# Patient Record
Sex: FEMALE | Race: WHITE | HISPANIC OR LATINO
[De-identification: names, ages, dates, MRNs, and addresses within clinical notes are randomized per-mention and may not be internally consistent; named-entity substitution may affect disease eponyms.]

---

## 2020-08-18 ENCOUNTER — APPOINTMENT (OUTPATIENT)
Dept: MRI IMAGING | Facility: CLINIC | Age: 45
End: 2020-08-18
Payer: COMMERCIAL

## 2020-08-18 PROCEDURE — 73221 MRI JOINT UPR EXTREM W/O DYE: CPT | Mod: RT

## 2020-09-01 ENCOUNTER — APPOINTMENT (OUTPATIENT)
Dept: RADIOLOGY | Facility: CLINIC | Age: 45
End: 2020-09-01
Payer: COMMERCIAL

## 2020-09-01 PROCEDURE — 77080 DXA BONE DENSITY AXIAL: CPT

## 2020-09-08 PROBLEM — Z00.00 ENCOUNTER FOR PREVENTIVE HEALTH EXAMINATION: Status: ACTIVE | Noted: 2020-09-08

## 2020-09-10 ENCOUNTER — APPOINTMENT (OUTPATIENT)
Dept: ORTHOPEDIC SURGERY | Facility: CLINIC | Age: 45
End: 2020-09-10

## 2020-10-12 ENCOUNTER — APPOINTMENT (OUTPATIENT)
Dept: ORTHOPEDIC SURGERY | Facility: CLINIC | Age: 45
End: 2020-10-12
Payer: COMMERCIAL

## 2020-10-12 VITALS
HEART RATE: 73 BPM | HEIGHT: 61 IN | DIASTOLIC BLOOD PRESSURE: 70 MMHG | BODY MASS INDEX: 20.77 KG/M2 | WEIGHT: 110 LBS | TEMPERATURE: 97.3 F | SYSTOLIC BLOOD PRESSURE: 111 MMHG

## 2020-10-12 DIAGNOSIS — M67.432 GANGLION, LEFT WRIST: ICD-10-CM

## 2020-10-12 DIAGNOSIS — Z78.9 OTHER SPECIFIED HEALTH STATUS: ICD-10-CM

## 2020-10-12 PROCEDURE — 99203 OFFICE O/P NEW LOW 30 MIN: CPT

## 2020-10-12 NOTE — HISTORY OF PRESENT ILLNESS
[Right] : right hand dominant [FreeTextEntry1] : She comes in today for an evaluation of a left wrist "lump" since 2 months ago. She denies any trauma or pain. \par \par She was referred by Dr. Churchill.

## 2020-10-12 NOTE — PHYSICAL EXAM
[de-identified] : - Constitutional: This is a female in no obvious distress.  \par - Psych: Patient is alert and oriented to person, place and time.  Patient has a normal mood and affect.\par - Cardiovascular: Normal pulses throughout the upper extremities.  No significant varicosities are noted in the upper extremities. \par - Neuro: Strength and sensation are intact throughout the upper extremities.  Patient has normal coordination.\par - Respiratory:  Patient exhibits no evidence of shortness of breath or difficulty breathing.\par - Skin: No rashes, lesions, or other abnormalities are noted in the upper extremities.\par ---\par \par Examination of her left wrist demonstrates a small volar ganglion cyst.  It is nontender.  It is relatively firm.  She has full motion.  It is nonpulsatile.  She is neurovascularly intact distally.\par

## 2020-10-12 NOTE — DISCUSSION/SUMMARY
"Chief Complaint   Patient presents with     Study Results     CPAP Follow Up       Initial /77  Pulse 60  Ht 1.753 m (5' 9\")  Wt 93.4 kg (206 lb)  SpO2 97%  BMI 30.42 kg/m2 Estimated body mass index is 30.42 kg/(m^2) as calculated from the following:    Height as of this encounter: 1.753 m (5' 9\").    Weight as of this encounter: 93.4 kg (206 lb).  Medication Reconciliation: complete    " [FreeTextEntry1] : She has findings consistent with an asymptomatic left wrist volar ganglion cyst.\par \par I had a discussion regarding today's visit, the diagnosis, and treatment recommendations / options. At this time, I recommended either observation or a cyst aspiration. As it is not bothering her, she agreed to proceed with observation.  I reassured her that this is not a cause for concern.  She will follow-up if her cyst increases in size or she develops pain. \par \par The patient has agreed to this plan of management and has expressed full understanding.  All questions were fully answered to the patient's satisfaction.\par \par Over 50% of the time spent with the patient was on counseling the patient on the above diagnosis, treatment plan and prognosis.\par \par

## 2020-10-12 NOTE — ADDENDUM
[FreeTextEntry1] : I, Enedina Rueda, acted solely as a scribe for Dr. Bhatia on this date 10/12/2020.\par \par

## 2020-10-12 NOTE — CONSULT LETTER
[Dear  ___] : Dear  [unfilled], [FreeTextEntry1] : Massimo Bhatia M.D.\par Surgery of the Hand & Upper Extremity\par Orthopaedic Surgery\par Chief, Hand Service, [facility]\par Director, Hand Service, [facility]\par  of Orthopedic Surgery, [facility] [employer] [facility] [place] \par [facility]\par \par Email: [email or web address] Office Phone: [phone]\par \par

## 2020-10-19 ENCOUNTER — APPOINTMENT (OUTPATIENT)
Dept: MRI IMAGING | Facility: CLINIC | Age: 45
End: 2020-10-19
Payer: COMMERCIAL

## 2020-10-19 PROCEDURE — 99072 ADDL SUPL MATRL&STAF TM PHE: CPT

## 2020-10-19 PROCEDURE — 72141 MRI NECK SPINE W/O DYE: CPT

## 2020-11-04 ENCOUNTER — APPOINTMENT (OUTPATIENT)
Dept: MAMMOGRAPHY | Facility: CLINIC | Age: 45
End: 2020-11-04
Payer: COMMERCIAL

## 2020-11-04 PROCEDURE — 77063 BREAST TOMOSYNTHESIS BI: CPT

## 2020-11-04 PROCEDURE — 99072 ADDL SUPL MATRL&STAF TM PHE: CPT

## 2020-11-04 PROCEDURE — 77067 SCR MAMMO BI INCL CAD: CPT

## 2020-11-24 ENCOUNTER — APPOINTMENT (OUTPATIENT)
Dept: ULTRASOUND IMAGING | Facility: CLINIC | Age: 45
End: 2020-11-24

## 2020-12-08 ENCOUNTER — APPOINTMENT (OUTPATIENT)
Dept: ULTRASOUND IMAGING | Facility: CLINIC | Age: 45
End: 2020-12-08
Payer: COMMERCIAL

## 2020-12-08 ENCOUNTER — RESULT REVIEW (OUTPATIENT)
Age: 45
End: 2020-12-08

## 2020-12-08 PROCEDURE — 76830 TRANSVAGINAL US NON-OB: CPT

## 2021-01-25 ENCOUNTER — APPOINTMENT (OUTPATIENT)
Dept: BREAST CENTER | Facility: CLINIC | Age: 46
End: 2021-01-25
Payer: COMMERCIAL

## 2021-01-25 VITALS
BODY MASS INDEX: 20.77 KG/M2 | WEIGHT: 110 LBS | HEART RATE: 73 BPM | SYSTOLIC BLOOD PRESSURE: 134 MMHG | HEIGHT: 61 IN | DIASTOLIC BLOOD PRESSURE: 86 MMHG | TEMPERATURE: 98.1 F

## 2021-01-25 DIAGNOSIS — R92.2 INCONCLUSIVE MAMMOGRAM: ICD-10-CM

## 2021-01-25 PROCEDURE — 99203 OFFICE O/P NEW LOW 30 MIN: CPT

## 2021-01-25 PROCEDURE — 99072 ADDL SUPL MATRL&STAF TM PHE: CPT

## 2021-01-25 RX ORDER — MAGNESIUM 100 MG
100 TABLET ORAL
Refills: 0 | Status: ACTIVE | COMMUNITY

## 2021-01-25 RX ORDER — CHOLECALCIFEROL (VITAMIN D3) 50 MCG
50 MCG CAPSULE ORAL
Refills: 0 | Status: ACTIVE | COMMUNITY

## 2021-01-25 RX ORDER — MULTIVIT-MIN/FOLIC/VIT K/LYCOP 400-300MCG
1000 TABLET ORAL
Refills: 0 | Status: ACTIVE | COMMUNITY

## 2021-01-25 NOTE — CONSULT LETTER
[Dear  ___] : Dear  [unfilled], [Consult Letter:] : I had the pleasure of evaluating your patient, [unfilled]. [Please see my note below.] : Please see my note below. [Consult Closing:] : Thank you very much for allowing me to participate in the care of this patient.  If you have any questions, please do not hesitate to contact me. [Sincerely,] : Sincerely, [FreeTextEntry3] : Alise Mayberry MD

## 2021-01-25 NOTE — DATA REVIEWED
[FreeTextEntry1] : 11/4/20 Heriberto Davis, TC is 24.4%, extremely dense breasts, right 12:00 tissue marker from prior biopsy. BR2

## 2021-01-25 NOTE — ASSESSMENT
[FreeTextEntry1] : 44 y/o female here for consultation, per Dr. Caty Peoples for high risk, TC was 24.4% on mmg report.  Pt is taking birth control does not know the name.\par Pt states in Nov her left breast was enlarged but now resolved. \par Pt denies any breast lesions, discharge or masses.\par Hx of benign bx 2017, does not know exact path, but no atypia.\par FHx negative for any cancers. \par 11/4/20 Heriberto Davis, TC is 24.4%, extremely dense breasts, right 12:00 tissue marker from prior biopsy. BR2\par CBE: FCC, dense breast, RIO.\par \par Reviewed studies with pt, extremely dense breasts and limited CBE so would rec screening u/s. Pt may have insurance issues to approve it. \par Also recalculated the TC score with ERIN and lifetime risk is 12.5% not 24.4%.\par

## 2021-01-25 NOTE — HISTORY OF PRESENT ILLNESS
[FreeTextEntry1] : 46 y/o female here for consultation, per Dr. Caty Peoples for high risk, TC was 24.4% on mmg report.  Pt is taking birth control does not know the name.\par Pt states in Nov her left breast was enlarged but now resolved. \par Pt denies any breast lesions, discharge or masses.\par Hx of benign bx 2017, does not know exact path, but no atypia.\par FHx negative for any cancers. \par 11/4/20 Heriberto Davis, TC is 24.4%, extremely dense breasts, right 12:00 tissue marker from prior biopsy. BR2

## 2021-01-25 NOTE — PHYSICAL EXAM
[Bra Size: ___] : Bra Size: [unfilled] [Normocephalic] : normocephalic [Atraumatic] : atraumatic [Supple] : supple [No Supraclavicular Adenopathy] : no supraclavicular adenopathy [No Thyromegaly] : no thyromegaly [Examined in the supine and seated position] : examined in the supine and seated position [Asymmetrical] : asymmetrical [No dominant masses] : no dominant masses in right breast  [No dominant masses] : no dominant masses left breast [No Nipple Retraction] : no left nipple retraction [No Nipple Discharge] : no left nipple discharge [No Axillary Lymphadenopathy] : no left axillary lymphadenopathy [No Edema] : no edema [No Swelling] : no swelling [Full ROM] : full range of motion [No Rashes] : no rashes [No Ulceration] : no ulceration [de-identified] : left slightly larger overall than right.\par

## 2021-02-12 ENCOUNTER — APPOINTMENT (OUTPATIENT)
Dept: OBGYN | Facility: CLINIC | Age: 46
End: 2021-02-12
Payer: COMMERCIAL

## 2021-02-12 VITALS
BODY MASS INDEX: 21.14 KG/M2 | HEIGHT: 61 IN | WEIGHT: 112 LBS | DIASTOLIC BLOOD PRESSURE: 80 MMHG | SYSTOLIC BLOOD PRESSURE: 122 MMHG

## 2021-02-12 DIAGNOSIS — Z30.09 ENCOUNTER FOR OTHER GENERAL COUNSELING AND ADVICE ON CONTRACEPTION: ICD-10-CM

## 2021-02-12 PROCEDURE — 99213 OFFICE O/P EST LOW 20 MIN: CPT

## 2021-02-12 PROCEDURE — 99072 ADDL SUPL MATRL&STAF TM PHE: CPT

## 2021-02-12 NOTE — HISTORY OF PRESENT ILLNESS
[FreeTextEntry1] : 46yo seen in [facility] here for urgent visit RLQ pain. She had pain 3 times in 2 wks. Made better by Advil. + Diarrhea. No other bowel changes. Pt has sxs of depression (decreased energy, appetite, body aches, insomnia) but denies any Si/Hi. has not seen her PCP. Wants to change her COCs.

## 2021-02-12 NOTE — PHYSICAL EXAM
[Labia Majora] : normal [Labia Minora] : normal [Normal] : normal [Uterine Adnexae] : normal [FreeTextEntry7] : mild RLQ pain. No R/G/R  [FreeTextEntry5] : GC/CT' BD affirm

## 2021-02-12 NOTE — DISCUSSION/SUMMARY
[FreeTextEntry1] : 46yo here for urgent visit with mulitple complaints. Pt has sxs of depression and needs to see her PCP (Dr Keys) ASAP. She denies any Si/Hi today and precautions were reviewed.\par \par RLQ pain: low suspcion of appy as pt has pain for 10 min at a time only 3 times over last 2 wks\par - TVUS to r/o ovarian cyst \par - BD affirm \par - GC/CT \par - She desires to switch COCs as thinks this may be contributing to her mood issues\par \par Alem Davenport MD \par Obstetrician/Gynecologist\par

## 2021-02-16 ENCOUNTER — NON-APPOINTMENT (OUTPATIENT)
Age: 46
End: 2021-02-16

## 2021-02-16 ENCOUNTER — RESULT REVIEW (OUTPATIENT)
Age: 46
End: 2021-02-16

## 2021-02-16 ENCOUNTER — APPOINTMENT (OUTPATIENT)
Dept: ULTRASOUND IMAGING | Facility: CLINIC | Age: 46
End: 2021-02-16
Payer: COMMERCIAL

## 2021-02-16 LAB
C TRACH RRNA SPEC QL NAA+PROBE: NOT DETECTED
CANDIDA VAG CYTO: NOT DETECTED
G VAGINALIS+PREV SP MTYP VAG QL MICRO: DETECTED
N GONORRHOEA RRNA SPEC QL NAA+PROBE: NOT DETECTED
SOURCE AMPLIFICATION: NORMAL
T VAGINALIS VAG QL WET PREP: NOT DETECTED

## 2021-02-16 PROCEDURE — 76641 ULTRASOUND BREAST COMPLETE: CPT | Mod: 50

## 2021-03-09 ENCOUNTER — APPOINTMENT (OUTPATIENT)
Dept: RADIOLOGY | Facility: CLINIC | Age: 46
End: 2021-03-09
Payer: COMMERCIAL

## 2021-03-09 ENCOUNTER — RESULT REVIEW (OUTPATIENT)
Age: 46
End: 2021-03-09

## 2021-03-09 ENCOUNTER — APPOINTMENT (OUTPATIENT)
Dept: ULTRASOUND IMAGING | Facility: CLINIC | Age: 46
End: 2021-03-09
Payer: COMMERCIAL

## 2021-03-09 PROCEDURE — 76830 TRANSVAGINAL US NON-OB: CPT

## 2021-03-09 PROCEDURE — 72100 X-RAY EXAM L-S SPINE 2/3 VWS: CPT

## 2021-03-09 PROCEDURE — 72200 X-RAY EXAM SI JOINTS: CPT

## 2021-04-08 ENCOUNTER — NON-APPOINTMENT (OUTPATIENT)
Age: 46
End: 2021-04-08

## 2021-05-10 ENCOUNTER — APPOINTMENT (OUTPATIENT)
Dept: OBGYN | Facility: CLINIC | Age: 46
End: 2021-05-10
Payer: COMMERCIAL

## 2021-05-10 ENCOUNTER — ASOB RESULT (OUTPATIENT)
Age: 46
End: 2021-05-10

## 2021-05-10 VITALS
WEIGHT: 114 LBS | HEIGHT: 61 IN | SYSTOLIC BLOOD PRESSURE: 120 MMHG | BODY MASS INDEX: 21.52 KG/M2 | DIASTOLIC BLOOD PRESSURE: 68 MMHG

## 2021-05-10 VITALS — TEMPERATURE: 98.1 F

## 2021-05-10 PROCEDURE — 99072 ADDL SUPL MATRL&STAF TM PHE: CPT

## 2021-05-10 PROCEDURE — 76830 TRANSVAGINAL US NON-OB: CPT

## 2021-05-10 PROCEDURE — 76856 US EXAM PELVIC COMPLETE: CPT | Mod: 59

## 2021-05-10 PROCEDURE — 99212 OFFICE O/P EST SF 10 MIN: CPT

## 2021-05-10 RX ORDER — NORETHINDRONE ACETATE AND ETHINYL ESTRADIOL AND FERROUS FUMARATE 1MG-20(21)
1-20 KIT ORAL
Qty: 84 | Refills: 3 | Status: DISCONTINUED | COMMUNITY
Start: 2021-02-12 | End: 2021-05-10

## 2021-05-10 NOTE — HISTORY OF PRESENT ILLNESS
[FreeTextEntry1] : 46yo here for siono follow. Pt has intermittent midline/suprapubic pain lasting 2 min, last episode was 1 month ago. Sono today demonstrates b/l ovarian cysts both about 2 cm each.

## 2021-05-10 NOTE — PHYSICAL EXAM
[Soft] : soft [Non-tender] : non-tender [Non-distended] : non-distended [No Lesions] : no lesions [No Mass] : no mass [FreeTextEntry7] : Unable to reproduce pain on today's exam.

## 2021-05-14 LAB
BACTERIA UR CULT: NORMAL
URINE CYTOLOGY: NORMAL

## 2021-06-17 NOTE — DISCUSSION/SUMMARY
Patient decided on 6/7 not to start his 6th cycle of CYBORD and stopped several other medications as well. Plans to follow up with doctor at [facility] for next steps. Telephone call placed for nutrition follow up. Call went straight to voicemail. Message left with contact information. Patient encouraged to call back with any questions or concerns. [FreeTextEntry1] : 44yo s/p sono today with simple, b/l, ov cyst both <2 min. No other sig findings. Pain intermittent. Strict torsion precautions reviewed. RTO in 6wks for repeat sono. Will give info fr Dr Acosta for coloscopy. Urine for cyto today. \par \par Alem Abraham MD \par Obstetrician/Gynecologist\par

## 2021-06-21 ENCOUNTER — ASOB RESULT (OUTPATIENT)
Age: 46
End: 2021-06-21

## 2021-06-21 ENCOUNTER — APPOINTMENT (OUTPATIENT)
Dept: OBGYN | Facility: CLINIC | Age: 46
End: 2021-06-21
Payer: COMMERCIAL

## 2021-06-21 VITALS
HEIGHT: 61 IN | SYSTOLIC BLOOD PRESSURE: 110 MMHG | BODY MASS INDEX: 20.77 KG/M2 | DIASTOLIC BLOOD PRESSURE: 66 MMHG | WEIGHT: 110 LBS

## 2021-06-21 PROCEDURE — 76830 TRANSVAGINAL US NON-OB: CPT

## 2021-06-21 PROCEDURE — 99072 ADDL SUPL MATRL&STAF TM PHE: CPT

## 2021-06-21 PROCEDURE — 99212 OFFICE O/P EST SF 10 MIN: CPT

## 2021-06-21 PROCEDURE — 76856 US EXAM PELVIC COMPLETE: CPT | Mod: 59

## 2021-06-21 NOTE — DISCUSSION/SUMMARY
[FreeTextEntry1] : 45yo LMP 6/13 here for follow up with b/l ov cyst without any concerning features and improved pain. \par \par - RTO late Sept for repeat sono \par - Torsion and rupture precautions reviewed \par - RTO in Dec for annual \par - All questions solicited and answered \par \par BCM \par - Pt started COCs yesterday \par - VTE, DVT, PE, MI, CVA and elevated BP precautions reviewed. \par - Pt RTO in sept for BP check \par \par Alem Abraham MD \par Obstetrician/Gynecologist\par \par \par \par Alem Abraham MD \par Obstetrician/Gynecologist\par

## 2021-06-21 NOTE — HISTORY OF PRESENT ILLNESS
[FreeTextEntry1] : 46yo 6/13 here for sono follow up.  Today her b/l ovarian cysts remain however her Lov cyst is about the same and her R ov cyst which was 3cm either resolved or shrunken and she now has two R ov cysts now 2cm or less.  No concerning features about either of them.  She denies any more period this month was normal. Of note she started COCs yesterday.

## 2021-07-21 ENCOUNTER — OUTPATIENT (OUTPATIENT)
Dept: OUTPATIENT SERVICES | Facility: HOSPITAL | Age: 46
LOS: 1 days | End: 2021-07-21

## 2021-10-04 ENCOUNTER — APPOINTMENT (OUTPATIENT)
Dept: OBGYN | Facility: CLINIC | Age: 46
End: 2021-10-04
Payer: COMMERCIAL

## 2021-10-04 ENCOUNTER — ASOB RESULT (OUTPATIENT)
Age: 46
End: 2021-10-04

## 2021-10-04 VITALS
WEIGHT: 114 LBS | DIASTOLIC BLOOD PRESSURE: 70 MMHG | HEIGHT: 61 IN | SYSTOLIC BLOOD PRESSURE: 118 MMHG | BODY MASS INDEX: 21.52 KG/M2

## 2021-10-04 PROCEDURE — 76830 TRANSVAGINAL US NON-OB: CPT

## 2021-10-04 PROCEDURE — 99212 OFFICE O/P EST SF 10 MIN: CPT

## 2021-10-04 PROCEDURE — 76856 US EXAM PELVIC COMPLETE: CPT | Mod: 59

## 2021-10-04 PROCEDURE — 99072 ADDL SUPL MATRL&STAF TM PHE: CPT

## 2021-11-18 ENCOUNTER — APPOINTMENT (OUTPATIENT)
Dept: RADIOLOGY | Facility: CLINIC | Age: 46
End: 2021-11-18
Payer: COMMERCIAL

## 2021-11-18 PROCEDURE — 72070 X-RAY EXAM THORAC SPINE 2VWS: CPT

## 2021-11-19 ENCOUNTER — APPOINTMENT (OUTPATIENT)
Dept: NEUROSURGERY | Facility: CLINIC | Age: 46
End: 2021-11-19
Payer: COMMERCIAL

## 2021-11-19 VITALS
TEMPERATURE: 98.5 F | WEIGHT: 110 LBS | DIASTOLIC BLOOD PRESSURE: 80 MMHG | HEIGHT: 61 IN | BODY MASS INDEX: 20.77 KG/M2 | SYSTOLIC BLOOD PRESSURE: 118 MMHG | OXYGEN SATURATION: 99 % | HEART RATE: 74 BPM

## 2021-11-19 PROCEDURE — 99203 OFFICE O/P NEW LOW 30 MIN: CPT

## 2021-11-19 PROCEDURE — 99072 ADDL SUPL MATRL&STAF TM PHE: CPT

## 2021-11-19 RX ORDER — METHYLPREDNISOLONE 4 MG/1
4 TABLET ORAL
Qty: 1 | Refills: 0 | Status: ACTIVE | COMMUNITY
Start: 2021-11-19 | End: 1900-01-01

## 2021-11-21 NOTE — HISTORY OF PRESENT ILLNESS
[> 3 months] : more  than 3 months [FreeTextEntry1] : neck, thoracic and UE pain.  [de-identified] : THONG BANDA is a 46 year old female presents for initial neurosurgical evaluation of cervical radiculopathy and thoracic pain.  No significant medical history.  Patient mentions her symptoms started about 6 months ago.  No trauma or injury.  Patient states she is employed as a .  Patient describes severe neck pain with radiation to R > L UE's.  Patient states the pain radiates to the right arm most prominent at the elbow.  She endorses loss of dexterity.  Left arm is intermittent pain.  No weakness.  Pain intensity 5/10.  At its worse 10/10.  Patient states the pain radiates to thoracic region at times with radiation to the front of body.  UE > neck pain.  Denies any saddle anesthesia, urinary or bowel incontinence.\par Patient is ambulating independently.  No loss of balance.  Patient states the pain in neck, arms and upper back was so severe she presented to [facility]  Xray of thoracic spine was obtained revealing no acute findings.  Patient has been involved in physical therapy for about 7 weeks which provides short lived improvement of pain. \par No UE EMG. She is managing her pain with NSAIDS. \par \par \par \par Denies any saddle anesthesia, urinary or bowel incontinence.\par \par

## 2021-11-21 NOTE — REASON FOR VISIT
[New Patient Visit] : a new patient visit [Referred By: _________] : Patient was referred by ANTONINO [FreeTextEntry1] : right cervical radiculopathy

## 2021-11-21 NOTE — PHYSICAL EXAM
[General Appearance - Alert] : alert [General Appearance - In No Acute Distress] : in no acute distress [Oriented To Time, Place, And Person] : oriented to person, place, and time [Impaired Insight] : insight and judgment were intact [Affect] : the affect was normal [Person] : oriented to person [Place] : oriented to place [Time] : oriented to time [Short Term Intact] : short term memory intact [Remote Intact] : remote memory intact [Fluency] : fluency intact [Comprehension] : comprehension intact [Vocabulary] : adequate range of vocabulary [Cranial Nerves Optic (II)] : visual acuity intact bilaterally,  pupils equal round and reactive to light [Cranial Nerves Oculomotor (III)] : extraocular motion intact [Cranial Nerves Trigeminal (V)] : facial sensation intact symmetrically [Cranial Nerves Facial (VII)] : face symmetrical [Cranial Nerves Glossopharyngeal (IX)] : tongue and palate midline [Cranial Nerves Accessory (XI - Cranial And Spinal)] : head turning and shoulder shrug symmetric [Cranial Nerves Hypoglossal (XII)] : there was no tongue deviation with protrusion [Motor Tone] : muscle tone was normal in all four extremities [Motor Strength] : muscle strength was normal in all four extremities [No Muscle Atrophy] : normal bulk in all four extremities [4] : C5 Biceps 4/5 [5] : S1 toe walking 5/5 [Sensation Tactile Decrease] : light touch was intact [Sensation Pain / Temperature Decrease] : pain and temperature was intact [Abnormal Walk] : normal gait [Balance] : balance was intact [2+] : Patella left 2+ [No Visual Abnormalities] : no visible abnormailities [Normal] : normal [Able to toe walk] : the patient was able to toe walk [Able to heel walk] : the patient was able to heel walk [Over the Past 2 Weeks, Have You Felt Down, Depressed, or Hopeless?] : 1.) Over the past 2 weeks, have you felt down, depressed, or hopeless? No [Over the Past 2 Weeks, Have You Felt Little Interest or Pleasure Doing Things?] : 2.) Over the past 2 weeks, have you felt little interest or pleasure doing things? No [Span Intact] : the attention span was decreased [Concentration Intact] : a decrease in concentrating ability was observed [Current Events] : inadequate knowledge of current events [Past History] : inadequate knowledge of personal past history [Past-pointing] : there was no past-pointing [Tremor] : no tremor present

## 2021-11-21 NOTE — ASSESSMENT
[FreeTextEntry1] : Ms. Flores presents with above history and imaging.  Patient has 4/5 RUE strength which is concerning.  I would like to obtain an MRI cervical and thoracic to further assess her symptoms.\par Continue physical therapy in the interim.\par Medrol dose pack as directed.\par Follow up after imaging.\par Patient has been given an opportunity to ask and have their questions answered to their satisfaction.\par Patient knows to call the office if there are any new or worsening symptoms.\par

## 2021-11-21 NOTE — REVIEW OF SYSTEMS
[As Noted in HPI] : as noted in HPI [Arm Weakness] : arm weakness [Numbness] : numbness [Tingling] : tingling [Negative] : Heme/Lymph [Difficulty Walking] : no difficulty walking [Incontinence] : no incontinence [FreeTextEntry9] : neck and thoracic region pain

## 2021-12-17 ENCOUNTER — ASOB RESULT (OUTPATIENT)
Age: 46
End: 2021-12-17

## 2021-12-17 ENCOUNTER — APPOINTMENT (OUTPATIENT)
Dept: OBGYN | Facility: CLINIC | Age: 46
End: 2021-12-17
Payer: COMMERCIAL

## 2021-12-17 PROCEDURE — 76830 TRANSVAGINAL US NON-OB: CPT

## 2021-12-17 PROCEDURE — 99072 ADDL SUPL MATRL&STAF TM PHE: CPT

## 2021-12-17 PROCEDURE — 76856 US EXAM PELVIC COMPLETE: CPT | Mod: 59

## 2022-01-04 NOTE — HISTORY OF PRESENT ILLNESS
[FreeTextEntry1] : 47yo LMP 9/6 here for follow up of TVUS and results review. She had a sono today which demonstrates b/l ovarian cysts. She has two R ov cyst. One is echogenic and remains <1cm and the other is simple and 2cm.  Her  L ov cyst is follicular in appearance and about 2cm as well.  She continues on COCs and is doing well.

## 2022-01-04 NOTE — DISCUSSION/SUMMARY
[FreeTextEntry1] : RTO in Dec for TVUS and annual a few days later\par Continue on COCs \par \par Alem Abraham MD \par Obstetrician/Gynecologist\par

## 2022-01-20 ENCOUNTER — APPOINTMENT (OUTPATIENT)
Dept: OBGYN | Facility: CLINIC | Age: 47
End: 2022-01-20

## 2022-02-01 ENCOUNTER — APPOINTMENT (OUTPATIENT)
Dept: OBGYN | Facility: CLINIC | Age: 47
End: 2022-02-01

## 2022-02-14 ENCOUNTER — APPOINTMENT (OUTPATIENT)
Dept: MRI IMAGING | Facility: CLINIC | Age: 47
End: 2022-02-14
Payer: MEDICAID

## 2022-02-14 PROCEDURE — 72141 MRI NECK SPINE W/O DYE: CPT

## 2022-02-14 PROCEDURE — 72146 MRI CHEST SPINE W/O DYE: CPT

## 2022-03-03 ENCOUNTER — APPOINTMENT (OUTPATIENT)
Dept: OBGYN | Facility: CLINIC | Age: 47
End: 2022-03-03
Payer: MEDICAID

## 2022-03-03 DIAGNOSIS — N83.202 UNSPECIFIED OVARIAN CYST, RIGHT SIDE: ICD-10-CM

## 2022-03-03 DIAGNOSIS — N83.201 UNSPECIFIED OVARIAN CYST, RIGHT SIDE: ICD-10-CM

## 2022-03-03 LAB
HCG UR QL: NEGATIVE
QUALITY CONTROL: YES

## 2022-03-03 PROCEDURE — 99396 PREV VISIT EST AGE 40-64: CPT

## 2022-03-03 PROCEDURE — 99212 OFFICE O/P EST SF 10 MIN: CPT | Mod: 25

## 2022-03-03 PROCEDURE — 81025 URINE PREGNANCY TEST: CPT

## 2022-03-03 NOTE — HISTORY OF PRESENT ILLNESS
[FreeTextEntry1] : THONG BANDA is a 46 year F   here for annual but also needs follow up on her cyst on her ovaries. last sono 21 - right echogenic focus seen (smaller), left ov 2.7 cm simple cyst\par Denies vaginal bleeding, discharge or pain.\par \par BC:tri lo sprintec\par Gynhx: Reg menses, No h/o STIs/fibroids/cysts/abn paps\par Obhx: x2\par \par Last mammo:10/2020\par Last Colonoscopy:\par Last Pap smear:1-2 years ago (none on file)\par Last dexa:not yet\par

## 2022-03-03 NOTE — PHYSICAL EXAM
[Appropriately responsive] : appropriately responsive [Alert] : alert [No Acute Distress] : no acute distress [No Lymphadenopathy] : no lymphadenopathy [Regular Rate Rhythm] : regular rate rhythm [Soft] : soft [Non-tender] : non-tender [Non-distended] : non-distended [No HSM] : No HSM [No Lesions] : no lesions [No Mass] : no mass [Oriented x3] : oriented x3 [Examination Of The Breasts] : a normal appearance [No Masses] : no breast masses were palpable [Labia Majora] : normal [Labia Minora] : normal [Normal] : normal [Retroversion] : retroverted [Uterine Adnexae] : normal

## 2022-03-03 NOTE — DISCUSSION/SUMMARY
[FreeTextEntry1] : THONG BANDA is a 46 year  here for WWE, b/l ovarian cyst, normal exam\par - pap/hpv sent\par - SBE reviewed, for mammo\par - Birth Control discussed - tri-lo sprintec sent\par - for repeat sono and then visit to see me\par

## 2022-03-06 LAB — HPV HIGH+LOW RISK DNA PNL CVX: NOT DETECTED

## 2022-03-10 LAB — CYTOLOGY CVX/VAG DOC THIN PREP: NORMAL

## 2022-03-14 ENCOUNTER — APPOINTMENT (OUTPATIENT)
Dept: OBGYN | Facility: CLINIC | Age: 47
End: 2022-03-14
Payer: MEDICAID

## 2022-03-14 ENCOUNTER — ASOB RESULT (OUTPATIENT)
Age: 47
End: 2022-03-14

## 2022-03-14 PROCEDURE — 76856 US EXAM PELVIC COMPLETE: CPT | Mod: 59

## 2022-03-14 PROCEDURE — 99072 ADDL SUPL MATRL&STAF TM PHE: CPT

## 2022-03-14 PROCEDURE — 76830 TRANSVAGINAL US NON-OB: CPT

## 2022-03-15 ENCOUNTER — NON-APPOINTMENT (OUTPATIENT)
Age: 47
End: 2022-03-15

## 2022-03-21 ENCOUNTER — APPOINTMENT (OUTPATIENT)
Dept: OBGYN | Facility: CLINIC | Age: 47
End: 2022-03-21

## 2022-03-29 ENCOUNTER — APPOINTMENT (OUTPATIENT)
Dept: MAMMOGRAPHY | Facility: CLINIC | Age: 47
End: 2022-03-29

## 2022-04-05 ENCOUNTER — APPOINTMENT (OUTPATIENT)
Dept: NEUROSURGERY | Facility: CLINIC | Age: 47
End: 2022-04-05
Payer: MEDICAID

## 2022-04-05 VITALS
HEIGHT: 61 IN | BODY MASS INDEX: 21.71 KG/M2 | SYSTOLIC BLOOD PRESSURE: 117 MMHG | RESPIRATION RATE: 18 BRPM | HEART RATE: 85 BPM | OXYGEN SATURATION: 100 % | WEIGHT: 115 LBS | DIASTOLIC BLOOD PRESSURE: 72 MMHG

## 2022-04-05 DIAGNOSIS — M50.10 CERVICAL DISC DISORDER WITH RADICULOPATHY, UNSPECIFIED CERVICAL REGION: ICD-10-CM

## 2022-04-05 DIAGNOSIS — M47.814 SPONDYLOSIS W/OUT MYELOPATHY OR RADICULOPATHY, THORACIC REGION: ICD-10-CM

## 2022-04-05 PROCEDURE — 99213 OFFICE O/P EST LOW 20 MIN: CPT

## 2022-04-05 PROCEDURE — 99072 ADDL SUPL MATRL&STAF TM PHE: CPT

## 2022-04-05 NOTE — REASON FOR VISIT
[Follow-Up: _____] : a [unfilled] follow-up visit [New Patient Visit] : a new patient visit [Referred By: _________] : Patient was referred by ANTONINO [FreeTextEntry1] : right cervical radiculopathy

## 2022-04-05 NOTE — ASSESSMENT
[FreeTextEntry1] : Ms. Flores presents with above history and imaging.  I have reviewed her MRI cervical and lumbar spine which does not reveal any acute findings. \par Continue amitriptyline, will titrate up to 25 mg at bedtime. \par Continue physical therapy in the interim.\par Medrol dose pack as directed.\par Follow up in 6 months. \par Patient has been given an opportunity to ask and have their questions answered to their satisfaction.\par Patient knows to call the office if there are any new or worsening symptoms.\par

## 2022-04-05 NOTE — HISTORY OF PRESENT ILLNESS
[> 3 months] : more  than 3 months [FreeTextEntry1] : neck, thoracic and UE pain.  [de-identified] : THONG BANDA is a 46 year old female presents for follow up and review of MRI cervical and lumbar spine  of cervical radiculopathy and thoracic pain.  No significant medical history.  Patient mentions her symptoms started about 6 months ago.  No trauma or injury.  Patient states she is employed as a .  Patient describes severe neck pain with radiation to R > L UE's.  Patient states the pain radiates to the right arm most prominent at the elbow.  She endorses loss of dexterity.  Left arm is intermittent pain.  No weakness.  Pain intensity 5/10.  At its worse 10/10.  Patient states the pain radiates to thoracic region at times with radiation to the front of body.  UE > neck pain.  Denies any saddle anesthesia, urinary or bowel incontinence.\par Patient is ambulating independently.  No loss of balance.  Patient states the pain in neck, arms and upper back is stable since last visit and responds nicely to amitriptyline.   Patient has been involved in physical therapy for about 7 weeks which provides short lived improvement of pain. \par No UE EMG. She is managing her pain with NSAIDS. \par \par \par \par Denies any saddle anesthesia, urinary or bowel incontinence.\par \par

## 2022-04-05 NOTE — DATA REVIEWED
[de-identified] : EXAM: [id number] - MR SPINE THORACIC - ORDERED BY: BERTA CAROLINA\par \par EXAM: [id number] - MR SPINE CERVICAL - ORDERED BY: BERTA CAROLINA\par \par \par PROCEDURE DATE: 02/14/2022\par \par \par \par INTERPRETATION: MR CERVICAL SPINE, MR THORACIC SPINE\par \par HISTORY: Cervical and thoracic spine pain. Radiculopathy, right more than left. Pain for a year.\par \par TECHNIQUE: Multiplanar MRI of the cervical spine and thoracic was performed without contrast using 5 sequences through the cervical spine and 4 sequences through the thoracic spine.\par \par COMPARISON: Thoracic spine x-rays dated 11/18/2021 and cervical spine MRI dated 10/19/2020.\par \par FINDINGS:\par \par OSSEOUS STRUCTURES\par  Fractures: None.\par  Alignment: There is reversal of the normal lordosis.\par  Marrow Signal: There are small scattered hemangioma/fatty foci.\par \par SPINAL CORD\par  Signal: Normal.\par  Conus Medullaris: Terminates at L1-L2.\par \par DISC LEVELS\par \par Cervical\par Craniovertebral Junction: Maintained.\par \par C2-C3: Maintained.\par \par C3-C4: Slight disc bulging is present without stenosis.\par \par C4-C5: Posterior disc osteophyte complex is present causing mild spinal canal stenosis and mild flattening of the ventral cord. Mild bilateral neural foraminal stenosis is present.\par \par C5-C6: Posterior disc osteophyte complex is present without significant spinal canal stenosis. Mild bilateral neural foraminal stenosis is present.\par \par C6-C7: Posterior disc bulging is present without significant spinal canal stenosis. Mild left neural foraminal stenosis is present.\par \par C7-T1: Maintained.\par \par Thoracic\par T1-T2: Slight posterior disc bulging is present without stenosis.\par \par Remaining thoracic discs are maintained and there is no stenosis.\par \par IMAGED INTRACRANIAL STRUCTURES\par Unremarkable.\par \par VISUALIZED PARANASAL SINUSES\par Clear.\par \par IMAGED LUNGS\par A limited on MRI of the visualized lungs demonstrate mild dependent atelectatic change.\par \par SOFT TISSUES\par Unremarkable.\par \par IMPRESSION:\par 1. Multilevel degenerative disc disease of the cervical spine is similar to the prior given differences in technique.\par 2. C4-C5 demonstrates mild spinal canal stenosis and mild flattening the ventral cord without abnormal cord signal.\par 3. There is mild variable cervical neural foraminal stenosis.\par 4. Mild T1-T2 degenerative disc disease without thoracic stenosis

## 2022-04-29 ENCOUNTER — APPOINTMENT (OUTPATIENT)
Dept: MAMMOGRAPHY | Facility: CLINIC | Age: 47
End: 2022-04-29
Payer: MEDICAID

## 2022-04-29 ENCOUNTER — RESULT REVIEW (OUTPATIENT)
Age: 47
End: 2022-04-29

## 2022-04-29 PROCEDURE — 77063 BREAST TOMOSYNTHESIS BI: CPT

## 2022-04-29 PROCEDURE — 77067 SCR MAMMO BI INCL CAD: CPT

## 2023-04-13 ENCOUNTER — APPOINTMENT (OUTPATIENT)
Dept: OBGYN | Facility: CLINIC | Age: 48
End: 2023-04-13
Payer: MEDICAID

## 2023-04-13 VITALS
DIASTOLIC BLOOD PRESSURE: 60 MMHG | BODY MASS INDEX: 19.63 KG/M2 | SYSTOLIC BLOOD PRESSURE: 100 MMHG | HEIGHT: 61 IN | WEIGHT: 104 LBS

## 2023-04-13 DIAGNOSIS — R61 GENERALIZED HYPERHIDROSIS: ICD-10-CM

## 2023-04-13 PROCEDURE — 99396 PREV VISIT EST AGE 40-64: CPT

## 2023-04-13 PROCEDURE — 99213 OFFICE O/P EST LOW 20 MIN: CPT | Mod: 25

## 2023-04-13 RX ORDER — NORETHINDRONE ACETATE AND ETHINYL ESTRADIOL 1.5; 3 MG/1; UG/1
1.5-3 TABLET ORAL
Qty: 84 | Refills: 3 | Status: ACTIVE | COMMUNITY
Start: 2023-04-13 | End: 1900-01-01

## 2023-04-13 NOTE — DISCUSSION/SUMMARY
[FreeTextEntry1] : THONG BANDA is a 47 year  here for WWE, normal exam\par - pap/hpv UTD\par - SBE reviewed, mammo/sono rx\par - Birth Control discussed - will up to junel 1.5/30\par - Follow up in 1 year for annual or PRN\par - we spoke about relizen, hrt, gabapentin,, lexapro - she would like to try relizena nd we will up her ocps

## 2023-04-13 NOTE — HISTORY OF PRESENT ILLNESS
[FreeTextEntry1] : THONG BANDA is a 47 year F   here for annual, reports night sweats - got her hormones tested and reports they were normal (she showed me - fsh 1.6, estradiol 30, tsh nl, cbc nl). Denies vaginal dryness, on ocps. Sexually active\par Denies vaginal bleeding, discharge or pain.\par \par BC:junel \par Gynhx: Reg menses, No h/o STIs/fibroids/cysts/abn paps\par Obhx: x2\par \par Last mammo:10/2020\par Last Colonoscopy:\par Last Pap smear:3/2022\par Last dexa:not yet\par

## 2023-05-10 ENCOUNTER — NON-APPOINTMENT (OUTPATIENT)
Age: 48
End: 2023-05-10

## 2023-05-25 ENCOUNTER — APPOINTMENT (OUTPATIENT)
Dept: ULTRASOUND IMAGING | Facility: CLINIC | Age: 48
End: 2023-05-25

## 2023-05-25 ENCOUNTER — RESULT REVIEW (OUTPATIENT)
Age: 48
End: 2023-05-25

## 2023-05-25 ENCOUNTER — APPOINTMENT (OUTPATIENT)
Dept: MAMMOGRAPHY | Facility: CLINIC | Age: 48
End: 2023-05-25
Payer: MEDICAID

## 2023-05-25 DIAGNOSIS — R92.8 OTHER ABNORMAL AND INCONCLUSIVE FINDINGS ON DIAGNOSTIC IMAGING OF BREAST: ICD-10-CM

## 2023-05-25 PROCEDURE — 76641 ULTRASOUND BREAST COMPLETE: CPT | Mod: 50

## 2023-05-25 PROCEDURE — 77063 BREAST TOMOSYNTHESIS BI: CPT

## 2023-05-25 PROCEDURE — 77067 SCR MAMMO BI INCL CAD: CPT

## 2023-05-26 ENCOUNTER — NON-APPOINTMENT (OUTPATIENT)
Age: 48
End: 2023-05-26

## 2023-07-20 ENCOUNTER — APPOINTMENT (OUTPATIENT)
Dept: OBGYN | Facility: CLINIC | Age: 48
End: 2023-07-20

## 2023-09-07 ENCOUNTER — APPOINTMENT (OUTPATIENT)
Dept: OBGYN | Facility: CLINIC | Age: 48
End: 2023-09-07

## 2023-09-19 ENCOUNTER — APPOINTMENT (OUTPATIENT)
Dept: OBGYN | Facility: CLINIC | Age: 48
End: 2023-09-19
Payer: MEDICAID

## 2023-09-19 VITALS
WEIGHT: 104.31 LBS | BODY MASS INDEX: 19.69 KG/M2 | SYSTOLIC BLOOD PRESSURE: 148 MMHG | DIASTOLIC BLOOD PRESSURE: 84 MMHG | HEIGHT: 61 IN

## 2023-09-19 DIAGNOSIS — Z12.4 ENCOUNTER FOR SCREENING FOR MALIGNANT NEOPLASM OF CERVIX: ICD-10-CM

## 2023-09-19 DIAGNOSIS — Z12.12 ENCOUNTER FOR SCREENING FOR MALIGNANT NEOPLASM OF RECTUM: ICD-10-CM

## 2023-09-19 PROCEDURE — 58100 BIOPSY OF UTERUS LINING: CPT

## 2023-09-19 PROCEDURE — 99214 OFFICE O/P EST MOD 30 MIN: CPT | Mod: 25

## 2023-09-19 PROCEDURE — 82270 OCCULT BLOOD FECES: CPT

## 2023-09-19 PROCEDURE — 81025 URINE PREGNANCY TEST: CPT

## 2023-09-19 RX ORDER — AMITRIPTYLINE HYDROCHLORIDE 25 MG/1
25 TABLET, FILM COATED ORAL
Qty: 30 | Refills: 0 | Status: DISCONTINUED | COMMUNITY
Start: 2022-04-05 | End: 2023-09-19

## 2023-09-19 RX ORDER — METRONIDAZOLE 500 MG/1
500 TABLET ORAL TWICE DAILY
Qty: 14 | Refills: 0 | Status: DISCONTINUED | COMMUNITY
Start: 2021-02-16 | End: 2023-09-19

## 2023-09-19 RX ORDER — AMITRIPTYLINE HYDROCHLORIDE 25 MG/1
25 TABLET, FILM COATED ORAL DAILY
Qty: 30 | Refills: 0 | Status: DISCONTINUED | COMMUNITY
Start: 2021-12-16 | End: 2023-09-19

## 2023-09-19 RX ORDER — AMITRIPTYLINE HYDROCHLORIDE 10 MG/1
10 TABLET, FILM COATED ORAL
Qty: 60 | Refills: 0 | Status: DISCONTINUED | COMMUNITY
Start: 2021-11-19 | End: 2023-09-19

## 2023-09-19 RX ORDER — SERTRALINE HYDROCHLORIDE 50 MG/1
50 TABLET, FILM COATED ORAL
Refills: 0 | Status: ACTIVE | COMMUNITY

## 2023-09-19 RX ORDER — AMITRIPTYLINE HYDROCHLORIDE 25 MG/1
25 TABLET, FILM COATED ORAL
Qty: 30 | Refills: 2 | Status: DISCONTINUED | COMMUNITY
Start: 2022-05-31 | End: 2023-09-19

## 2023-09-19 RX ORDER — AMITRIPTYLINE HYDROCHLORIDE 10 MG/1
10 TABLET, FILM COATED ORAL
Qty: 30 | Refills: 0 | Status: DISCONTINUED | COMMUNITY
Start: 2022-02-28 | End: 2023-09-19

## 2023-09-19 RX ORDER — TRAMADOL HYDROCHLORIDE 25 MG/1
TABLET, COATED ORAL
Refills: 0 | Status: ACTIVE | COMMUNITY

## 2023-09-27 ENCOUNTER — APPOINTMENT (OUTPATIENT)
Dept: CT IMAGING | Facility: CLINIC | Age: 48
End: 2023-09-27
Payer: MEDICAID

## 2023-09-27 ENCOUNTER — RESULT REVIEW (OUTPATIENT)
Age: 48
End: 2023-09-27

## 2023-09-27 PROCEDURE — 74177 CT ABD & PELVIS W/CONTRAST: CPT

## 2023-10-04 ENCOUNTER — APPOINTMENT (OUTPATIENT)
Dept: OBGYN | Facility: CLINIC | Age: 48
End: 2023-10-04
Payer: MEDICAID

## 2023-10-04 VITALS
HEIGHT: 61 IN | DIASTOLIC BLOOD PRESSURE: 90 MMHG | SYSTOLIC BLOOD PRESSURE: 165 MMHG | WEIGHT: 105 LBS | BODY MASS INDEX: 19.83 KG/M2

## 2023-10-04 DIAGNOSIS — Z78.9 OTHER SPECIFIED HEALTH STATUS: ICD-10-CM

## 2023-10-04 LAB
ALBUMIN SERPL ELPH-MCNC: 4.7 G/DL
ALP BLD-CCNC: 75 U/L
ALT SERPL-CCNC: 40 U/L
ANION GAP SERPL CALC-SCNC: 25 MMOL/L
AST SERPL-CCNC: 88 U/L
BILIRUB SERPL-MCNC: 0.3 MG/DL
BUN SERPL-MCNC: 13 MG/DL
CALCIUM SERPL-MCNC: 9.2 MG/DL
CHLORIDE SERPL-SCNC: 96 MMOL/L
CO2 SERPL-SCNC: 16 MMOL/L
CORE LAB BIOPSY: NORMAL
CREAT SERPL-MCNC: 0.6 MG/DL
CYTOLOGY CVX/VAG DOC THIN PREP: NORMAL
DATE COLLECTED: NORMAL
EGFR: 111 ML/MIN/1.73M2
ESTRADIOL SERPL-MCNC: 32 PG/ML
ETHANOL BLD-MCNC: 152 MG/DL
FERRITIN SERPL-MCNC: 167 NG/ML
FOLATE SERPL-MCNC: 10 NG/ML
FSH SERPL-MCNC: 0.8 IU/L
GLUCOSE SERPL-MCNC: 48 MG/DL
HEMOCCULT SP1 STL QL: NEGATIVE
HPV HIGH+LOW RISK DNA PNL CVX: NOT DETECTED
IRON SATN MFR SERPL: 34 %
IRON SERPL-MCNC: 162 UG/DL
LH SERPL-ACNC: <0.3 IU/L
POTASSIUM SERPL-SCNC: 4 MMOL/L
PROLACTIN SERPL-MCNC: 14.8 NG/ML
PROT SERPL-MCNC: 7.7 G/DL
QUALITY CONTROL: YES
RBC # BLD: 3.71 M/UL
RETICS # AUTO: 1.7 %
RETICS AGGREG/RBC NFR: 63.1 K/UL
SODIUM SERPL-SCNC: 137 MMOL/L
T3FREE SERPL-MCNC: 3.42 PG/ML
T4 FREE SERPL-MCNC: 1 NG/DL
TIBC SERPL-MCNC: 470 UG/DL
TSH SERPL-ACNC: 1.74 UIU/ML
UIBC SERPL-MCNC: 308 UG/DL
VIT B12 SERPL-MCNC: 552 PG/ML

## 2023-10-04 PROCEDURE — 99214 OFFICE O/P EST MOD 30 MIN: CPT

## 2023-11-16 LAB
HCG UR QL: NEGATIVE
QUALITY CONTROL: YES

## 2023-11-17 ENCOUNTER — RESULT REVIEW (OUTPATIENT)
Age: 48
End: 2023-11-17

## 2023-11-17 ENCOUNTER — NON-APPOINTMENT (OUTPATIENT)
Age: 48
End: 2023-11-17

## 2023-11-17 ENCOUNTER — APPOINTMENT (OUTPATIENT)
Dept: OBGYN | Facility: HOSPITAL | Age: 48
End: 2023-11-17

## 2023-11-17 RX ORDER — OXYCODONE AND ACETAMINOPHEN 5; 325 MG/1; MG/1
5-325 TABLET ORAL
Qty: 30 | Refills: 0 | Status: ACTIVE | COMMUNITY
Start: 2023-11-17 | End: 1900-01-01

## 2023-11-17 RX ORDER — IBUPROFEN 600 MG/1
600 TABLET, FILM COATED ORAL 3 TIMES DAILY
Qty: 30 | Refills: 0 | Status: ACTIVE | COMMUNITY
Start: 2023-11-17 | End: 1900-01-01

## 2023-11-18 ENCOUNTER — NON-APPOINTMENT (OUTPATIENT)
Age: 48
End: 2023-11-18

## 2023-11-20 ENCOUNTER — NON-APPOINTMENT (OUTPATIENT)
Age: 48
End: 2023-11-20

## 2023-11-30 ENCOUNTER — APPOINTMENT (OUTPATIENT)
Dept: OBGYN | Facility: CLINIC | Age: 48
End: 2023-11-30
Payer: MEDICAID

## 2023-11-30 VITALS
BODY MASS INDEX: 19.5 KG/M2 | HEIGHT: 61 IN | WEIGHT: 103.31 LBS | DIASTOLIC BLOOD PRESSURE: 85 MMHG | SYSTOLIC BLOOD PRESSURE: 138 MMHG

## 2023-11-30 DIAGNOSIS — R10.31 RIGHT LOWER QUADRANT PAIN: ICD-10-CM

## 2023-11-30 DIAGNOSIS — N76.0 ACUTE VAGINITIS: ICD-10-CM

## 2023-11-30 DIAGNOSIS — Z01.419 ENCOUNTER FOR GYNECOLOGICAL EXAMINATION (GENERAL) (ROUTINE) W/OUT ABNORMAL FINDINGS: ICD-10-CM

## 2023-11-30 DIAGNOSIS — N92.4 EXCESSIVE BLEEDING IN THE PREMENOPAUSAL PERIOD: ICD-10-CM

## 2023-11-30 DIAGNOSIS — B96.89 ACUTE VAGINITIS: ICD-10-CM

## 2023-11-30 PROCEDURE — 99024 POSTOP FOLLOW-UP VISIT: CPT

## 2023-12-06 ENCOUNTER — APPOINTMENT (OUTPATIENT)
Dept: OBGYN | Facility: CLINIC | Age: 48
End: 2023-12-06

## 2023-12-06 ENCOUNTER — NON-APPOINTMENT (OUTPATIENT)
Age: 48
End: 2023-12-06

## 2023-12-08 ENCOUNTER — RESULT REVIEW (OUTPATIENT)
Age: 48
End: 2023-12-08

## 2023-12-08 ENCOUNTER — APPOINTMENT (OUTPATIENT)
Dept: ULTRASOUND IMAGING | Facility: CLINIC | Age: 48
End: 2023-12-08
Payer: MEDICAID

## 2023-12-08 ENCOUNTER — NON-APPOINTMENT (OUTPATIENT)
Age: 48
End: 2023-12-08

## 2023-12-08 PROCEDURE — 76642 ULTRASOUND BREAST LIMITED: CPT | Mod: RT

## 2023-12-11 ENCOUNTER — APPOINTMENT (OUTPATIENT)
Dept: OBGYN | Facility: CLINIC | Age: 48
End: 2023-12-11
Payer: COMMERCIAL

## 2023-12-11 VITALS
BODY MASS INDEX: 19.74 KG/M2 | WEIGHT: 104.56 LBS | DIASTOLIC BLOOD PRESSURE: 73 MMHG | SYSTOLIC BLOOD PRESSURE: 132 MMHG | HEIGHT: 61 IN

## 2023-12-11 DIAGNOSIS — F10.20 ALCOHOL DEPENDENCE, UNCOMPLICATED: ICD-10-CM

## 2023-12-11 DIAGNOSIS — R19.7 DIARRHEA, UNSPECIFIED: ICD-10-CM

## 2023-12-11 DIAGNOSIS — Z98.890 OTHER SPECIFIED POSTPROCEDURAL STATES: ICD-10-CM

## 2023-12-11 PROCEDURE — 36415 COLL VENOUS BLD VENIPUNCTURE: CPT

## 2023-12-11 PROCEDURE — 99213 OFFICE O/P EST LOW 20 MIN: CPT | Mod: 24

## 2023-12-13 DIAGNOSIS — R14.0 ABDOMINAL DISTENSION (GASEOUS): ICD-10-CM

## 2023-12-13 RX ORDER — SACCHAROMYCES BOULARDII 50 MG
250 CAPSULE ORAL TWICE DAILY
Qty: 1 | Refills: 1 | Status: ACTIVE | COMMUNITY
Start: 2023-12-13 | End: 1900-01-01

## 2024-01-01 LAB
ALBUMIN SERPL ELPH-MCNC: 4.4 G/DL
ALP BLD-CCNC: 90 U/L
ALT SERPL-CCNC: 26 U/L
AMYLASE/CREAT SERPL: 90 U/L
ANION GAP SERPL CALC-SCNC: 12 MMOL/L
AST SERPL-CCNC: 20 U/L
BASOPHILS # BLD AUTO: 0.02 K/UL
BASOPHILS NFR BLD AUTO: 0.4 %
BILIRUB SERPL-MCNC: 0.6 MG/DL
BUN SERPL-MCNC: 19 MG/DL
CALCIUM SERPL-MCNC: 9.5 MG/DL
CHLORIDE SERPL-SCNC: 99 MMOL/L
CO2 SERPL-SCNC: 27 MMOL/L
CREAT SERPL-MCNC: 0.65 MG/DL
EGFR: 109 ML/MIN/1.73M2
EOSINOPHIL # BLD AUTO: 0.08 K/UL
EOSINOPHIL NFR BLD AUTO: 1.8 %
ETHANOL BLD-MCNC: <10 MG/DL
GLUCOSE SERPL-MCNC: 51 MG/DL
HCT VFR BLD CALC: 38.4 %
HGB BLD-MCNC: 12.5 G/DL
IMM GRANULOCYTES NFR BLD AUTO: 0.2 %
LPL SERPL-CCNC: 38 U/L
LYMPHOCYTES # BLD AUTO: 1.08 K/UL
LYMPHOCYTES NFR BLD AUTO: 24.2 %
MAN DIFF?: NORMAL
MCHC RBC-ENTMCNC: 32.6 GM/DL
MCHC RBC-ENTMCNC: 32.8 PG
MCV RBC AUTO: 100.8 FL
MONOCYTES # BLD AUTO: 0.36 K/UL
MONOCYTES NFR BLD AUTO: 8.1 %
NEUTROPHILS # BLD AUTO: 2.92 K/UL
NEUTROPHILS NFR BLD AUTO: 65.3 %
PLATELET # BLD AUTO: 192 K/UL
POTASSIUM SERPL-SCNC: 5 MMOL/L
PROT SERPL-MCNC: 7.4 G/DL
RBC # BLD: 3.81 M/UL
RBC # FLD: 12.1 %
SODIUM SERPL-SCNC: 139 MMOL/L
WBC # FLD AUTO: 4.47 K/UL

## 2024-01-04 ENCOUNTER — APPOINTMENT (OUTPATIENT)
Dept: OBGYN | Facility: CLINIC | Age: 49
End: 2024-01-04

## 2024-01-08 ENCOUNTER — NON-APPOINTMENT (OUTPATIENT)
Age: 49
End: 2024-01-08

## 2024-01-23 ENCOUNTER — APPOINTMENT (OUTPATIENT)
Dept: OBGYN | Facility: CLINIC | Age: 49
End: 2024-01-23

## 2024-04-18 NOTE — END OF VISIT
[FreeTextEntry3] : All medical record entries made by the Scribe were at my, Dr. Bhatia, direction and personally dictated by me on 10/12/2020. I have reviewed the chart and agree that the record accurately reflects my personal performances of the history, physical exam, assessment, and plan. I have also personally directed, reviewed, and agreed with the chart.\par \par  Her/She

## 2024-05-15 DIAGNOSIS — Z12.39 ENCOUNTER FOR OTHER SCREENING FOR MALIGNANT NEOPLASM OF BREAST: ICD-10-CM

## 2024-07-11 ENCOUNTER — RESULT REVIEW (OUTPATIENT)
Age: 49
End: 2024-07-11

## 2024-07-11 ENCOUNTER — APPOINTMENT (OUTPATIENT)
Dept: ULTRASOUND IMAGING | Facility: CLINIC | Age: 49
End: 2024-07-11

## 2024-07-11 ENCOUNTER — APPOINTMENT (OUTPATIENT)
Dept: MAMMOGRAPHY | Facility: CLINIC | Age: 49
End: 2024-07-11
Payer: COMMERCIAL

## 2024-07-11 PROCEDURE — 76641 ULTRASOUND BREAST COMPLETE: CPT | Mod: 50

## 2024-07-11 PROCEDURE — 77067 SCR MAMMO BI INCL CAD: CPT

## 2024-07-11 PROCEDURE — 77063 BREAST TOMOSYNTHESIS BI: CPT

## 2024-08-29 ENCOUNTER — APPOINTMENT (OUTPATIENT)
Dept: OPHTHALMOLOGY | Facility: CLINIC | Age: 49
End: 2024-08-29

## 2024-09-05 ENCOUNTER — NON-APPOINTMENT (OUTPATIENT)
Age: 49
End: 2024-09-05

## 2024-10-02 ENCOUNTER — APPOINTMENT (OUTPATIENT)
Dept: OBGYN | Facility: CLINIC | Age: 49
End: 2024-10-02

## 2025-01-16 ENCOUNTER — NON-APPOINTMENT (OUTPATIENT)
Age: 50
End: 2025-01-16

## 2025-01-30 ENCOUNTER — NON-APPOINTMENT (OUTPATIENT)
Age: 50
End: 2025-01-30

## 2025-02-19 ENCOUNTER — APPOINTMENT (OUTPATIENT)
Dept: OBGYN | Facility: CLINIC | Age: 50
End: 2025-02-19

## 2025-02-25 ENCOUNTER — APPOINTMENT (OUTPATIENT)
Dept: OBGYN | Facility: CLINIC | Age: 50
End: 2025-02-25

## 2025-02-25 ENCOUNTER — RESULT REVIEW (OUTPATIENT)
Age: 50
End: 2025-02-25

## 2025-02-25 DIAGNOSIS — Z12.12 ENCOUNTER FOR SCREENING FOR MALIGNANT NEOPLASM OF RECTUM: ICD-10-CM

## 2025-02-25 DIAGNOSIS — Z12.39 ENCOUNTER FOR OTHER SCREENING FOR MALIGNANT NEOPLASM OF BREAST: ICD-10-CM

## 2025-02-25 DIAGNOSIS — Z12.4 ENCOUNTER FOR SCREENING FOR MALIGNANT NEOPLASM OF CERVIX: ICD-10-CM

## 2025-02-25 PROBLEM — N95.1 SYMPTOMS, SUCH AS FLUSHING, SLEEPLESSNESS, HEADACHE, LACK OF CONCENTRATION, ASSOCIATED WITH THE MENOPAUSE: Status: ACTIVE | Noted: 2025-02-25

## 2025-02-25 PROCEDURE — 99459 PELVIC EXAMINATION: CPT

## 2025-02-25 PROCEDURE — 82270 OCCULT BLOOD FECES: CPT

## 2025-02-25 PROCEDURE — 99396 PREV VISIT EST AGE 40-64: CPT

## 2025-02-25 RX ORDER — ESTRADIOL 1.25 MG/1.25G
1.25 GEL TOPICAL
Qty: 3 | Refills: 0 | Status: ACTIVE | COMMUNITY
Start: 2025-02-25 | End: 1900-01-01

## 2025-02-25 RX ORDER — QUETIAPINE FUMARATE 400 MG/1
TABLET ORAL
Refills: 0 | Status: ACTIVE | COMMUNITY

## 2025-02-26 LAB
DATE COLLECTED: NORMAL
HEMOCCULT SP1 STL QL: NEGATIVE
HPV HIGH+LOW RISK DNA PNL CVX: NOT DETECTED

## 2025-02-27 ENCOUNTER — APPOINTMENT (OUTPATIENT)
Dept: ULTRASOUND IMAGING | Facility: CLINIC | Age: 50
End: 2025-02-27
Payer: COMMERCIAL

## 2025-02-27 ENCOUNTER — RESULT REVIEW (OUTPATIENT)
Age: 50
End: 2025-02-27

## 2025-02-27 PROCEDURE — 76642 ULTRASOUND BREAST LIMITED: CPT | Mod: LT

## 2025-03-01 LAB — CYTOLOGY CVX/VAG DOC THIN PREP: NORMAL

## 2025-03-05 DIAGNOSIS — N95.1 MENOPAUSAL AND FEMALE CLIMACTERIC STATES: ICD-10-CM

## 2025-03-05 DIAGNOSIS — R61 GENERALIZED HYPERHIDROSIS: ICD-10-CM

## 2025-03-05 RX ORDER — ESTRADIOL 0.1 MG/D
0.1 PATCH, EXTENDED RELEASE TRANSDERMAL
Qty: 1 | Refills: 3 | Status: ACTIVE | COMMUNITY
Start: 2025-03-05 | End: 1900-01-01

## 2025-03-10 ENCOUNTER — NON-APPOINTMENT (OUTPATIENT)
Age: 50
End: 2025-03-10

## 2025-08-05 ENCOUNTER — NON-APPOINTMENT (OUTPATIENT)
Age: 50
End: 2025-08-05